# Patient Record
Sex: FEMALE | Race: BLACK OR AFRICAN AMERICAN | NOT HISPANIC OR LATINO | Employment: FULL TIME | URBAN - METROPOLITAN AREA
[De-identification: names, ages, dates, MRNs, and addresses within clinical notes are randomized per-mention and may not be internally consistent; named-entity substitution may affect disease eponyms.]

---

## 2023-11-22 PROBLEM — E78.5 HYPERLIPIDEMIA: Status: ACTIVE | Noted: 2023-11-22

## 2023-11-22 PROBLEM — K58.9 IBS (IRRITABLE BOWEL SYNDROME): Status: ACTIVE | Noted: 2023-11-22

## 2023-11-22 PROBLEM — R92.30 DENSE BREAST TISSUE ON MAMMOGRAM: Status: RESOLVED | Noted: 2023-11-22 | Resolved: 2023-11-22

## 2023-11-22 PROBLEM — J30.2 SEASONAL ALLERGIES: Status: ACTIVE | Noted: 2023-11-22

## 2023-11-22 PROBLEM — J45.909 ASTHMA (HHS-HCC): Status: ACTIVE | Noted: 2023-11-22

## 2023-11-22 PROBLEM — N76.0 ACUTE VAGINITIS: Status: RESOLVED | Noted: 2023-11-22 | Resolved: 2023-11-22

## 2023-11-22 PROBLEM — N90.4 LEUKOPLAKIA OF VULVA: Status: ACTIVE | Noted: 2023-11-22

## 2023-11-22 PROBLEM — K21.9 GASTROESOPHAGEAL REFLUX: Status: ACTIVE | Noted: 2023-11-22

## 2023-11-22 RX ORDER — TIOTROPIUM BROMIDE INHALATION SPRAY 3.12 UG/1
SPRAY, METERED RESPIRATORY (INHALATION)
COMMUNITY
Start: 2021-07-08

## 2023-11-22 RX ORDER — CLOBETASOL PROPIONATE 0.5 MG/G
CREAM TOPICAL 2 TIMES DAILY
COMMUNITY
Start: 2022-02-14

## 2023-11-22 RX ORDER — EZETIMIBE 10 MG/1
TABLET ORAL
COMMUNITY
Start: 2021-07-08

## 2023-11-22 RX ORDER — MONTELUKAST SODIUM 10 MG/1
TABLET ORAL
COMMUNITY
Start: 2021-07-08

## 2023-11-22 RX ORDER — DEXLANSOPRAZOLE 60 MG/1
CAPSULE, DELAYED RELEASE ORAL
COMMUNITY
Start: 2021-07-08

## 2023-11-22 RX ORDER — MEPOLIZUMAB 100 MG/ML
INJECTION, SOLUTION SUBCUTANEOUS
COMMUNITY
Start: 2021-07-08

## 2023-11-22 RX ORDER — ALBUTEROL SULFATE 90 UG/1
AEROSOL, METERED RESPIRATORY (INHALATION)
COMMUNITY
Start: 2021-07-08

## 2023-11-27 ENCOUNTER — ANCILLARY PROCEDURE (OUTPATIENT)
Dept: RADIOLOGY | Facility: CLINIC | Age: 58
End: 2023-11-27
Payer: COMMERCIAL

## 2023-11-27 ENCOUNTER — OFFICE VISIT (OUTPATIENT)
Dept: OBSTETRICS AND GYNECOLOGY | Facility: CLINIC | Age: 58
End: 2023-11-27
Payer: COMMERCIAL

## 2023-11-27 VITALS
HEIGHT: 62 IN | WEIGHT: 160 LBS | DIASTOLIC BLOOD PRESSURE: 60 MMHG | BODY MASS INDEX: 29.44 KG/M2 | SYSTOLIC BLOOD PRESSURE: 122 MMHG

## 2023-11-27 DIAGNOSIS — R10.2 PELVIC PAIN: ICD-10-CM

## 2023-11-27 DIAGNOSIS — R10.2 PELVIC PAIN: Primary | ICD-10-CM

## 2023-11-27 DIAGNOSIS — Z12.31 BREAST CANCER SCREENING BY MAMMOGRAM: ICD-10-CM

## 2023-11-27 DIAGNOSIS — Z01.419 WELL WOMAN EXAM WITH ROUTINE GYNECOLOGICAL EXAM: ICD-10-CM

## 2023-11-27 PROCEDURE — 1036F TOBACCO NON-USER: CPT | Performed by: OBSTETRICS & GYNECOLOGY

## 2023-11-27 PROCEDURE — 88175 CYTOPATH C/V AUTO FLUID REDO: CPT

## 2023-11-27 PROCEDURE — 99396 PREV VISIT EST AGE 40-64: CPT | Performed by: OBSTETRICS & GYNECOLOGY

## 2023-11-27 PROCEDURE — 88141 CYTOPATH C/V INTERPRET: CPT | Performed by: PATHOLOGY

## 2023-11-27 PROCEDURE — 87624 HPV HI-RISK TYP POOLED RSLT: CPT

## 2023-11-27 PROCEDURE — 76830 TRANSVAGINAL US NON-OB: CPT | Performed by: OBSTETRICS & GYNECOLOGY

## 2023-11-27 RX ORDER — AZELASTINE 1 MG/ML
1 SPRAY, METERED NASAL 2 TIMES DAILY
COMMUNITY
Start: 2023-06-15

## 2023-11-27 NOTE — PROGRESS NOTES
Subjective   Patient ID: Laina Duran is a 58 y.o. female who presents for Well Women Visit (Annual exam with pap smear/- mammogram done 9/2023 ([facility])//C/o  off and on right sided pelvic pain//Chaperone declined).  HPI  As continued chief complaint  Review of Systems  10 symptoms have been reviewed and are negative and noncontributory to the patient current ailments.    Objective   Physical Exam  Vital signs reviewed    CONSTITUTIONAL- well nourished, well developed, looks like stated age.  She is sitting comfortably on the exam table in no apparent distress  SKIN- normal skin color and pigmentation no visible lesions  EYES- normal external exam  THYROID- symmetrical ,normal size and normal consistency  HEART- RRR without murmur, S3 or S4.  LUNGS- breathing comfortably, no dyspnea  EXTREMITIES- no deformities  NEUROLOGICAL- oriented and no focal signs.  Cranial nerves II through XII intact  PSYCHIATRIC- alert, pleasant and cordial, age-appropriate, not anxious, or depressed appearing  BREASTS-normal appearance, no skin changes or nipple discharge.  Palpation of the breast and axillae: No palpable mass and no axillary lymphadenopathy  ABDOMEN- soft, non distended, bowel sound normal pitch and intensity,no palpable abnormal masses  GENITOURINARY- External genitalia: Normal.                                 - Uterus: AV/AF NSSC, mobile and nontender                                -The adnexal areas are free of tenderness or mass                                -There are no cervical lesions; there is no cervical motion tenderness                                -Vagina without lesions, mucosa pink and well-hydrated, discharge is physiologic.                                   Inspection of Perianal Area: Normal    Assessment/Plan   Diagnoses and all orders for this visit:  Pelvic pain  Well woman exam with routine gynecological exam  Breast cancer screening by mammogram    Will check pelvic ultrasound however symptoms  of right-sided pelvic pain may be more gastrointestinal in nature.  Patient return to the office if pain persists

## 2023-12-29 ENCOUNTER — ANCILLARY PROCEDURE (OUTPATIENT)
Dept: RADIOLOGY | Facility: CLINIC | Age: 58
End: 2023-12-29
Payer: COMMERCIAL

## 2023-12-29 VITALS — BODY MASS INDEX: 29.45 KG/M2 | HEIGHT: 62 IN | WEIGHT: 160.05 LBS

## 2023-12-29 DIAGNOSIS — Z12.31 ENCOUNTER FOR SCREENING MAMMOGRAM FOR MALIGNANT NEOPLASM OF BREAST: ICD-10-CM

## 2023-12-29 PROCEDURE — 77063 BREAST TOMOSYNTHESIS BI: CPT | Mod: BILATERAL PROCEDURE | Performed by: RADIOLOGY

## 2023-12-29 PROCEDURE — 77067 SCR MAMMO BI INCL CAD: CPT | Mod: BILATERAL PROCEDURE | Performed by: RADIOLOGY

## 2023-12-29 PROCEDURE — 77067 SCR MAMMO BI INCL CAD: CPT

## 2024-04-19 ENCOUNTER — APPOINTMENT (OUTPATIENT)
Dept: PULMONOLOGY | Facility: CLINIC | Age: 59
End: 2024-04-19
Payer: COMMERCIAL

## 2024-04-19 NOTE — PROGRESS NOTES
Department of Medicine  Division of Pulmonary, Critical Care, and Sleep Medicine  Consultation  [facility]    I was asked by No ref. provider found, to evaluate Laina Duran for asthma. I have independently interviewed and examined the patient in the office and reviewed available records. The finalized note is available in the electronic medical record for review by the referring physician.     Physician HPI (4/18/2024): 58 y.o. female former smoker (quit in 2001, *** PY)with eosinophilic asthma, mild ERIC (not using CPAP at night), chronic allergic rhinitis, hyperlipidemia, GERD, GI bleed, chronic constipation, and arthritis who is establishing care with me. She previously followed with Dr. UZAIR Mercado from [facility]. Her last office visit with him was in 09/2023.     Current asthma therapy: Continue on Nucala sc injections as scheduled. The patient is compliant with and benefiting from treatment with Nucala.   Continue on Advair /21 two inhalations twice daily, Sprivia Respimat 2.5 mcg to inhalations daily, with Albuterol inhaler & nebulizer as needed for SOB.   Continue on Astelin nasal spray BID and Singulair 10 mg daily.       Immunization History:  Immunization History   Administered Date(s) Administered    Pfizer Purple Cap SARS-CoV-2 03/29/2021, 04/19/2021, 11/11/2021       Family History:  No family history on file.    Social History:  Social History     Socioeconomic History    Marital status:      Spouse name: Not on file    Number of children: Not on file    Years of education: Not on file    Highest education level: Not on file   Occupational History    Not on file   Tobacco Use    Smoking status: Never    Smokeless tobacco: Never   Vaping Use    Vaping status: Never Used   Substance and Sexual Activity    Alcohol use: Yes     Comment: social    Drug use: Yes     Comment: marijuana    Sexual activity: Yes     Partners: Male     Birth control/protection: Female Sterilization    Other Topics Concern    Not on file   Social History Narrative    Not on file     Social Determinants of Health     Financial Resource Strain: Medium Risk (2/6/2023)    Received from [facility]    Overall Financial Resource Strain (CARDIA)     Difficulty of Paying Living Expenses: Somewhat hard   Food Insecurity: No Food Insecurity (2/6/2023)    Received from [facility]    Hunger Vital Sign     Worried About Running Out of Food in the Last Year: Never true     Ran Out of Food in the Last Year: Never true   Transportation Needs: No Transportation Needs (2/6/2023)    Received from [facility]    PRAPARE - Transportation     Lack of Transportation (Medical): No     Lack of Transportation (Non-Medical): No   Physical Activity: Insufficiently Active (2/6/2023)    Received from [facility]    Exercise Vital Sign     Days of Exercise per Week: 7 days     Minutes of Exercise per Session: 20 min   Stress: No Stress Concern Present (2/6/2023)    Received from [facility]    [employer] [place] [employer] - Occupational Stress Questionnaire     Feeling of Stress : Only a little   Social Connections: Moderately Isolated (2/6/2023)    Received from [facility]    Social Connection and Isolation Panel [NHANES]     Frequency of Communication with Friends and Family: Once a week     Frequency of Social Gatherings with Friends and Family: Once a week     Attends Hinduism Services: Never     Active Member of Clubs or Organizations: Yes     Attends Club or Organization Meetings: 1 to 4 times per year     Marital Status:    Intimate Partner Violence: Not on file   Housing Stability: Low Risk  (2/6/2023)    Received from [facility]    Housing Stability Vital Sign     Unable to Pay for Housing in the Last Year: No     Number of Places Lived in the Last Year: 1     Unstable Housing in the Last Year: No       Current Medications:  Current Outpatient Medications   Medication  Instructions    albuterol 90 mcg/actuation inhaler inhalation    azelastine (Astelin) 137 mcg (0.1 %) nasal spray 1 spray, Does not apply, 2 times daily    clobetasol (Temovate) 0.05 % cream 2 times daily    dexlansoprazole (Dexilant) 60 mg DR capsule oral    ezetimibe (Zetia) 10 mg tablet oral    linaCLOtide (Linzess) 145 mcg capsule oral    mepolizumab (Nucala) 100 mg/mL syringe subcutaneous    montelukast (Singulair) 10 mg tablet oral    tiotropium (Spiriva Respimat) 2.5 mcg/actuation inhaler inhalation        Drug Allergies/Intolerances:  Allergies   Allergen Reactions    Gabapentin Rash    Penicillins Rash    Prednisone Hives and Itching        Review of Systems:  Review of Systems     All other review of systems are negative and/or non-contributory.    Physical Examination:  LMP  (LMP Unknown) Comment: SUPRACERVICAL HYSTERECTOMY     Measured SpO2 is with ambient air at rest    General: no acute distress  HEENT: no icterus  Neck: no goiter   Chest: clear to auscultation bilaterally, normal vesicular breath sounds, no use of accessory muscles of respiration noted   Cardiac: strong, right radial pulse   Abdomen: non-distended   Extremities: no lower extremity edema noted  Psychiatric: did not appear depressed or anxious  Neuro: alert and orientated to person, place, and time    Pulmonary Function Test Results     No PFTs or spirometry on file     Chest Radiograph     C-XR 09/2007:   IMPRESSION:   No visible active pulmonary disease      Echocardiogram     No results found for this or any previous visit from the past 365 days.       Chest CT Scan     No results found for this or any previous visit from the past 365 days.         Relevant Laboratory Tests     Lab Results   Component Value Date    WBC 7.3 08/16/2022    HGB 12.3 08/16/2022    HCT 36.1 08/16/2022    MCV 90 08/16/2022     08/16/2022   Eos 0%, 0.00      Assessment and Plan / Recommendations:  Problem List Items Addressed This Visit    None        Follow-up: Visit date not found     Parts of this note may have been generated using voice dictation software, Dragon.  Homophonic errors may exist.  Please contact me directly if clarification is needed.    John Bruce MD  Staff Physician - Interventional Pulmonology  10:00 PM  04/18/24  Office number: [phone]  Fax number:  [phone]

## 2025-01-20 ENCOUNTER — APPOINTMENT (OUTPATIENT)
Dept: OBSTETRICS AND GYNECOLOGY | Facility: CLINIC | Age: 60
End: 2025-01-20
Payer: COMMERCIAL

## 2025-01-20 VITALS
HEIGHT: 62 IN | SYSTOLIC BLOOD PRESSURE: 130 MMHG | DIASTOLIC BLOOD PRESSURE: 78 MMHG | BODY MASS INDEX: 27.42 KG/M2 | WEIGHT: 149 LBS

## 2025-01-20 DIAGNOSIS — Z01.419 WELL WOMAN EXAM WITH ROUTINE GYNECOLOGICAL EXAM: ICD-10-CM

## 2025-01-20 DIAGNOSIS — Z12.31 BREAST CANCER SCREENING BY MAMMOGRAM: ICD-10-CM

## 2025-01-20 PROCEDURE — 99396 PREV VISIT EST AGE 40-64: CPT | Performed by: OBSTETRICS & GYNECOLOGY

## 2025-01-20 PROCEDURE — 88175 CYTOPATH C/V AUTO FLUID REDO: CPT

## 2025-01-20 PROCEDURE — 3008F BODY MASS INDEX DOCD: CPT | Performed by: OBSTETRICS & GYNECOLOGY

## 2025-01-20 PROCEDURE — 88141 CYTOPATH C/V INTERPRET: CPT | Performed by: STUDENT IN AN ORGANIZED HEALTH CARE EDUCATION/TRAINING PROGRAM

## 2025-01-20 PROCEDURE — 1036F TOBACCO NON-USER: CPT | Performed by: OBSTETRICS & GYNECOLOGY

## 2025-01-20 PROCEDURE — 87624 HPV HI-RISK TYP POOLED RSLT: CPT

## 2025-01-20 RX ORDER — PANTOPRAZOLE SODIUM 40 MG/1
TABLET, DELAYED RELEASE ORAL
COMMUNITY
Start: 2024-08-15

## 2025-01-20 RX ORDER — HYOSCYAMINE SULFATE 0.12 MG/1
TABLET SUBLINGUAL
COMMUNITY
Start: 2024-02-07

## 2025-01-20 RX ORDER — LINACLOTIDE 290 UG/1
CAPSULE, GELATIN COATED ORAL
COMMUNITY
Start: 2024-10-14

## 2025-01-20 RX ORDER — FLUTICASONE PROPIONATE AND SALMETEROL 500; 50 UG/1; UG/1
1 POWDER RESPIRATORY (INHALATION) 2 TIMES DAILY
COMMUNITY
Start: 2024-08-07

## 2025-01-20 RX ORDER — PRAVASTATIN SODIUM 20 MG/1
20 TABLET ORAL NIGHTLY
COMMUNITY
Start: 2025-01-02

## 2025-01-20 RX ORDER — CETIRIZINE HYDROCHLORIDE 10 MG/1
10 TABLET ORAL
COMMUNITY
Start: 2024-04-15

## 2025-01-20 RX ORDER — IPRATROPIUM BROMIDE AND ALBUTEROL SULFATE 2.5; .5 MG/3ML; MG/3ML
SOLUTION RESPIRATORY (INHALATION)
COMMUNITY
Start: 2025-01-05

## 2025-01-20 NOTE — PROGRESS NOTES
Subjective   Patient ID: Laina Duran is a 59 y.o. female who presents for Well Women Visit (Annual exam with pap smear and mammogram order//No complaints//Chaperone declined).  HPI  As contained in the chief complaint  Review of Systems  10 systems have been reviewed and are negative and noncontributory to the patient current ailments.    Objective   Physical Exam  Vital signs reviewed    CONSTITUTIONAL- well nourished, well developed, looks like stated age.  She is sitting comfortably on the exam table in no apparent distress  SKIN- normal skin color and pigmentation no visible lesions  EYES- normal external exam  THYROID- symmetrical ,normal size and normal consistency  HEART- RRR without murmur, S3 or S4.  LUNGS- breathing comfortably, no dyspnea  EXTREMITIES- no deformities.  Edema, discoloration, or pain in BLE  NEUROLOGICAL- A/Ox3, conversational   PSYCHIATRIC- alert, pleasant and cordial, age-appropriate, not anxious, or depressed appearing  BREASTS-normal appearance, no skin changes or nipple discharge.  Palpation of the breast and axillae: No palpable mass and no axillary lymphadenopathy  ABDOMEN- soft, non distended, bowel sound normal pitch and intensity,no palpable abnormal masses  GENITOURINARY- External genitalia: Normal.  Localized vitaligo                               - Uterus: AV/AF NSSC, mobile and nontender                                -The adnexal areas are free of tenderness or mass                                -There are no cervical lesions; there is no cervical motion tenderness                                -Vagina without lesions, mucosa pink and well-hydrated, discharge is physiologic.                                   Inspection of Perianal Area: Normal    Assessment/Plan   Diagnoses and all orders for this visit:  Well woman exam with routine gynecological exam  -     THINPREP PAP TEST  Breast cancer screening by mammogram  -     BI mammo bilateral screening tomosynthesis;  Future           Fabio Augustin DO 01/20/25 8:43 AM

## 2025-01-22 ENCOUNTER — HOSPITAL ENCOUNTER (OUTPATIENT)
Dept: RADIOLOGY | Facility: CLINIC | Age: 60
Discharge: HOME | End: 2025-01-22
Payer: COMMERCIAL

## 2025-01-22 DIAGNOSIS — Z12.31 BREAST CANCER SCREENING BY MAMMOGRAM: ICD-10-CM

## 2025-01-22 PROCEDURE — 77067 SCR MAMMO BI INCL CAD: CPT

## 2025-01-22 PROCEDURE — 77067 SCR MAMMO BI INCL CAD: CPT | Mod: BILATERAL PROCEDURE | Performed by: RADIOLOGY

## 2025-01-22 PROCEDURE — 77063 BREAST TOMOSYNTHESIS BI: CPT | Mod: BILATERAL PROCEDURE | Performed by: RADIOLOGY

## 2025-04-28 ENCOUNTER — OFFICE VISIT (OUTPATIENT)
Dept: URGENT CARE | Age: 60
End: 2025-04-28
Payer: COMMERCIAL

## 2025-04-28 VITALS
HEART RATE: 75 BPM | RESPIRATION RATE: 18 BRPM | DIASTOLIC BLOOD PRESSURE: 62 MMHG | SYSTOLIC BLOOD PRESSURE: 123 MMHG | OXYGEN SATURATION: 98 % | TEMPERATURE: 97.9 F

## 2025-04-28 DIAGNOSIS — J30.2 SEASONAL ALLERGIES: ICD-10-CM

## 2025-04-28 DIAGNOSIS — J45.41 MODERATE PERSISTENT INTRINSIC ASTHMA WITH ACUTE EXACERBATION: Primary | ICD-10-CM

## 2025-04-28 PROCEDURE — 1036F TOBACCO NON-USER: CPT | Performed by: NURSE PRACTITIONER

## 2025-04-28 PROCEDURE — 99204 OFFICE O/P NEW MOD 45 MIN: CPT | Performed by: NURSE PRACTITIONER

## 2025-04-28 RX ORDER — AZITHROMYCIN 250 MG/1
TABLET, FILM COATED ORAL
Qty: 6 TABLET | Refills: 0 | Status: SHIPPED | OUTPATIENT
Start: 2025-04-28 | End: 2025-05-03

## 2025-04-28 RX ORDER — AZELASTINE 1 MG/ML
1 SPRAY, METERED NASAL 2 TIMES DAILY
Qty: 30 ML | Refills: 0 | Status: SHIPPED | OUTPATIENT
Start: 2025-04-28

## 2025-04-28 ASSESSMENT — ENCOUNTER SYMPTOMS
FATIGUE: 1
SINUS PRESSURE: 1
WHEEZING: 1
COUGH: 1
NAUSEA: 0
DIZZINESS: 0
ABDOMINAL PAIN: 0
ARTHRALGIAS: 0
VOMITING: 0
SORE THROAT: 0
HEADACHES: 1
APPETITE CHANGE: 0
PHOTOPHOBIA: 0
SHORTNESS OF BREATH: 1
ACTIVITY CHANGE: 0
RHINORRHEA: 0
SINUS PAIN: 1
MYALGIAS: 0
DIARRHEA: 0

## 2025-04-28 NOTE — PATIENT INSTRUCTIONS
Thank you for letting me care for you today.  You have been seen today for asthma exacerbation   Start Advair 2 times a day  Refilled your nasal spray  Follow-up with your pulmonologist  Please seek care in emergency room for red flags as discussed during visit.

## 2025-04-28 NOTE — LETTER
April 28, 2025     Patient: Laina Duran   YOB: 1965   Date of Visit: 4/28/2025       To Whom It May Concern:    Laina Duran was seen in my clinic on 4/28/2025 at 9:40 am. Please excuse Laina for her absence from work on this day to make the appointment. She will return 4/29/2025.    If you have any questions or concerns, please don't hesitate to call.         Sincerely,         Kristin L Schoenlein, APRN-CNP        CC: No Recipients

## 2025-04-28 NOTE — PROGRESS NOTES
Subjective   Patient ID: Laina Duran is a 59 y.o. female. They present today with a chief complaint of sinus pressure (Asthma and sinus pressure for 4 days).    History of Present Illness  This is a 59-year-old female with a medical history of, but limited to, allergic rhinitis and asthma presents to the clinic today with an acute onset allergy/asthma exacerbation.  Patient states 4 days ago she developed significant sinus pressure and pain.  Does nasal sprays on Allegra-D daily at baseline.  Patient states she started having to use nebulizer due to wheezing/chest congestion.  Patient takes Advair daily and is compliant.  Patient states she cannot take any type of p.o. steroid as it caused significant rash.  States pulmonologist usually prescribes azithromycin for asthma exacerbation.  Denies shortness of breath at rest, orthopnea, pedal edema.  Denies fever, chills.            Past Medical History  Allergies as of 04/28/2025 - Reviewed 04/28/2025   Allergen Reaction Noted    Gabapentin Rash 11/22/2023    Penicillins Rash 11/22/2023    Prednisone Hives and Itching 11/22/2023       Prescriptions Prior to Admission[1]     Medical History[2]    Surgical History[3]     reports that she has never smoked. She has never been exposed to tobacco smoke. She has never used smokeless tobacco. She reports current alcohol use. She reports current drug use. Drug: Marijuana.    Review of Systems  Review of Systems   Constitutional:  Positive for fatigue. Negative for activity change and appetite change.   HENT:  Positive for congestion, sinus pressure and sinus pain. Negative for ear pain, rhinorrhea and sore throat.    Eyes:  Negative for photophobia and visual disturbance.   Respiratory:  Positive for cough, shortness of breath and wheezing.    Cardiovascular:  Negative for chest pain and leg swelling.   Gastrointestinal:  Negative for abdominal pain, diarrhea, nausea and vomiting.   Musculoskeletal:  Negative for  arthralgias and myalgias.   Skin:  Negative for rash.   Neurological:  Positive for headaches (Denies one-sided weakness, slurred speech, facial droop, vision changes, and worst headache of life/thunderclap onset.). Negative for dizziness.   All other systems reviewed and are negative.                                 Objective    Vitals:    04/28/25 0953 04/28/25 1015   BP: 123/62    Pulse: 75    Resp: 18    Temp: 36.6 °C (97.9 °F)    SpO2: 94% 98%     No LMP recorded (lmp unknown). Patient has had a hysterectomy.    Physical Exam  Vitals reviewed.   Constitutional:       Appearance: Normal appearance. She is normal weight.   HENT:      Head: Normocephalic and atraumatic.      Right Ear: Tympanic membrane, ear canal and external ear normal.      Left Ear: Tympanic membrane, ear canal and external ear normal.      Nose: Mucosal edema, congestion and rhinorrhea present.      Right Sinus: Maxillary sinus tenderness and frontal sinus tenderness present.      Left Sinus: Maxillary sinus tenderness and frontal sinus tenderness present.      Mouth/Throat:      Mouth: Mucous membranes are moist.      Pharynx: Oropharynx is clear. Postnasal drip present.   Eyes:      Extraocular Movements: Extraocular movements intact.      Conjunctiva/sclera: Conjunctivae normal.      Pupils: Pupils are equal, round, and reactive to light.   Cardiovascular:      Rate and Rhythm: Normal rate and regular rhythm.      Pulses: Normal pulses.      Heart sounds: Normal heart sounds.   Pulmonary:      Effort: Pulmonary effort is normal.      Breath sounds: Normal breath sounds.   Musculoskeletal:         General: Normal range of motion.      Cervical back: Normal range of motion and neck supple.   Skin:     General: Skin is warm and dry.      Capillary Refill: Capillary refill takes less than 2 seconds.   Neurological:      General: No focal deficit present.      Mental Status: She is alert and oriented to person, place, and time.          Procedures    Point of Care Test & Imaging Results from this visit  No results found for this visit on 04/28/25.   Imaging  No results found.    Cardiology, Vascular, and Other Imaging  No other imaging results found for the past 2 days      Diagnostic study results (if any) were reviewed by Kristin L Schoenlein, APRN-CNP.    Assessment/Plan   Allergies, medications, history, and pertinent labs/EKGs/Imaging reviewed by Kristin L Schoenlein, APRN-CNP.     Medical Decision Making  VSS, NAD, Nontoxic appearing.  Physical exam as document above.  Patient deferred in office testing at this time.    Symptoms, history, and exam are consistent with: Asthma exacerbation.  Will place patient on azithromycin per normal course of her treatment.  Refilled nasal spray.  Encouraged her to start Advair 2 times a day and to follow-up with her pulmonologist.  Strict ED precautions reviewed at length.  Low suspicion for bacterial etiology at this time    Differential Dx include, however, are not limited to: Nasopharyngitis, allergic rhinitis, viral illness, bronchitis, pneumonia, sinusitis    I have a low suspicion for any acute pathologies requiring emergent evaluation and further workup at this time.  I believe patient is safe to discharge home with a low threshold for emergency room as discussed during visit.  We discussed close follow-up with primary care provider/pediatrician. Supportive care discussed.  Medication(s) profile of OTC and Rxed medication(s) if prescribed was (were) reviewed.  All questions answered and addressed.  Patient verbalized understanding.      Orders and Diagnoses  Diagnoses and all orders for this visit:  Moderate persistent intrinsic asthma with acute exacerbation  -     azithromycin (Zithromax) 250 mg tablet; Take 2 tablets (500 mg) by mouth once daily for 1 day, THEN 1 tablet (250 mg) once daily for 4 days.  Seasonal allergies  -     azelastine (Astelin) 137 mcg (0.1 %) nasal spray; Administer  1 spray into each nostril 2 times a day.      Medical Admin Record      Patient disposition: Home    Electronically signed by Kristin L Schoenlein, APRN-CNP  11:43 AM           [1] (Not in a hospital admission)   [2]   Past Medical History:  Diagnosis Date    Breast cyst 2001   [3]   Past Surgical History:  Procedure Laterality Date    BREAST BIOPSY  1999    HYSTERECTOMY  2002    OTHER SURGICAL HISTORY  07/08/2021    Cystoscopy    OTHER SURGICAL HISTORY  07/08/2021    Hysterectomy supracervical    OTHER SURGICAL HISTORY  07/08/2021    Nerve block anesthesia    OTHER SURGICAL HISTORY  07/08/2021    Colonoscopy